# Patient Record
Sex: MALE | Race: OTHER
[De-identification: names, ages, dates, MRNs, and addresses within clinical notes are randomized per-mention and may not be internally consistent; named-entity substitution may affect disease eponyms.]

---

## 2019-04-01 ENCOUNTER — HOSPITAL ENCOUNTER (EMERGENCY)
Dept: HOSPITAL 72 - EMR | Age: 39
Discharge: HOME | End: 2019-04-01
Payer: COMMERCIAL

## 2019-04-01 VITALS — HEIGHT: 61 IN | WEIGHT: 195 LBS | BODY MASS INDEX: 36.82 KG/M2

## 2019-04-01 VITALS — DIASTOLIC BLOOD PRESSURE: 82 MMHG | SYSTOLIC BLOOD PRESSURE: 151 MMHG

## 2019-04-01 DIAGNOSIS — K21.9: ICD-10-CM

## 2019-04-01 DIAGNOSIS — M25.512: Primary | ICD-10-CM

## 2019-04-01 PROCEDURE — 93005 ELECTROCARDIOGRAM TRACING: CPT

## 2019-04-01 PROCEDURE — 99283 EMERGENCY DEPT VISIT LOW MDM: CPT

## 2019-04-01 NOTE — NUR
ED Nurse Note:

 Pt arrived ED from home, c/o left arm pain 4/10 today. Pt is A/O X4. Vital 
signs stable at this time, waiting for orders.

## 2019-04-01 NOTE — NUR
ER DISCHARGE NOTE:

Patient is cleared to be discharged per Dr. Mendez. Pt is A/Ox4 on room air 
with stable vital signs. 

Pt was given dc and prescription instructions, pt was able to verbalize 
understanding. Pt's ID band 

removed. Pt is able to ambulate with steady gait and took all belongings.

## 2019-04-01 NOTE — EMERGENCY ROOM REPORT
History of Present Illness


General


Chief Complaint:  Pain


Source:  Patient





Present Illness


HPI


39-year-old male presents ED for evaluation.  Complaining of left shoulder 

pain.  Started last night.  Dull, 3 out of 10, nonradiating.  Denies any recent 

injury.  Denies chest pain or shortness of breath.  Notes history of acid 

reflux.  Denies any neck pain or neck stiffness.  Denies fevers chills.  No 

other aggravating relieving factors.  Denies any other associated symptoms


Allergies:  


Coded Allergies:  


     No Known Allergies (Unverified , 4/1/19)





Patient History


Past Medical History:  GERD, psych hx


Past Surgical History:  none


Pertinent Family History:  none


Social History:  Denies: smoking, alcohol use, drug use


Immunizations:  UTD


Reviewed Nursing Documentation:  PMH: Agreed; PSxH: Agreed





Nursing Documentation-PMH


Past Medical History:  No Stated History


Hx Gastrointestinal Problems:  Yes - gerd





Review of Systems


All Other Systems:  negative except mentioned in HPI





Physical Exam





Vital Signs








  Date Time  Temp Pulse Resp B/P (MAP) Pulse Ox O2 Delivery O2 Flow Rate FiO2


 


4/1/19 00:56 98.1 91 16 159/88 96 Room Air  








Sp02 EP Interpretation:  reviewed, normal


General Appearance:  no apparent distress, alert, GCS 15, non-toxic


Head:  normocephalic, atraumatic


Eyes:  bilateral eye normal inspection, bilateral eye PERRL


ENT:  hearing grossly normal, normal pharynx, no angioedema, normal voice


Neck:  full range of motion, supple/symm/no masses


Respiratory:  chest non-tender, lungs clear, normal breath sounds, speaking 

full sentences


Cardiovascular #1:  regular rate, rhythm, no edema


Cardiovascular #2:  2+ carotid (R), 2+ carotid (L), 2+ radial (R), 2+ radial (L)

, 2+ dorsalis pedis (R), 2+ dorsalis pedis (L)


Gastrointestinal:  normal bowel sounds, non tender, soft, non-distended, no 

guarding, no rebound


Rectal:  deferred


Genitourinary:  normal inspection, no CVA tenderness


Musculoskeletal:  back normal, gait/station normal, normal range of motion, non-

tender


Neurologic:  alert, oriented x3, responsive, motor strength/tone normal, 

sensory intact, speech normal


Psychiatric:  judgement/insight normal, memory normal, mood/affect normal, no 

suicidal/homicidal ideation


Reflexes:  3+ bicep (R), 3+ bicep (L), 3+ tricep (R), 3+ tricep (L), 3+ knee (R)

, 3+ knee (L)


Skin:  normal color, no rash, warm/dry, well hydrated


Lymphatic:  no adenopathy





Medical Decision Making


Diagnostic Impression:  


 Primary Impression:  


 Shoulder pain


 Qualified Codes:  M25.512 - Pain in left shoulder


ER Course


39-year-old male presents ED complaining of left shoulder pain





Differentialstrain, contusion, GERD, atypical chest pain





Patient placed on stretcher.  After initial history physical exam reveals male 

in no acute distress.  There is full range of motion to the left shoulder.  No 

crepitus or bruising.  No signs of fracture dislocation.





Patient is stable vitals.  I ordered EKG





EKG - Normal sinus rhythm, no acute ischemic changes interpreted by me





Discussed findings with patient.  I do not suspect coronary event.  Pain is 

likely muscular.  Patient also has anxiety which could be exacerbating his 

symptoms.





Safe for discharge with close outpatient follow-up.  We'll provide PMD referrals





diagnosis - shoulder pain 





stable and discharged to home with Rx Tylenol.  followup with PMD. return to ED 

if symptoms recur/worsen


EKG Diagnostic Results


Rate:  normal


Rhythm:  NSR


ST Segments:  no acute changes


ASA given to the pt in ED:  No





Rhythm Strip Diag. Results


EP Interpretation:  yes


Rhythm:  NSR, no PVC's, no ectopy





Last Vital Signs








  Date Time  Temp Pulse Resp B/P (MAP) Pulse Ox O2 Delivery O2 Flow Rate FiO2


 


4/1/19 01:25 98.1 77 16 150/83 97 Room Air  








Status:  improved


Disposition:  HOME, SELF-CARE


Condition:  Stable


Scripts


Acetaminophen* (TYLENOL EXTRA STRENGTH*) 500 Mg Tablet


500 MG ORAL Q8H PRN for Prn Headache/Temp > 101, #30 TAB 0 Refills


   Prov: Devyn Mendez MD         4/1/19


Referrals:  


NOT CHOSEN IPA/MD,REFERRING (PCP)











[facility]


Patient Instructions:  Muscle Pain, Adult











Devyn Mendez MD Apr 1, 2019 04:21

## 2019-04-02 NOTE — CARDIOLOGY REPORT
--------------- APPROVED REPORT --------------





EKG Measurement

Heart [id number]

WV [id number]

[id number]

[id number]

[id number]





Normal sinus rhythm

Normal ECG